# Patient Record
Sex: MALE | Race: WHITE | NOT HISPANIC OR LATINO | Employment: FULL TIME | URBAN - METROPOLITAN AREA
[De-identification: names, ages, dates, MRNs, and addresses within clinical notes are randomized per-mention and may not be internally consistent; named-entity substitution may affect disease eponyms.]

---

## 2023-10-24 RX ORDER — ATORVASTATIN CALCIUM 80 MG/1
80 TABLET, FILM COATED ORAL NIGHTLY
COMMUNITY

## 2023-10-24 RX ORDER — ASPIRIN 81 MG/1
81 TABLET ORAL DAILY
COMMUNITY

## 2023-10-24 RX ORDER — ISOSORBIDE MONONITRATE 30 MG/1
30 TABLET, EXTENDED RELEASE ORAL DAILY
COMMUNITY

## 2023-10-24 RX ORDER — INDOMETHACIN 75 MG/1
75 CAPSULE, EXTENDED RELEASE ORAL 2 TIMES DAILY
COMMUNITY

## 2023-10-24 RX ORDER — LISINOPRIL 10 MG/1
10 TABLET ORAL DAILY
COMMUNITY

## 2023-10-27 ENCOUNTER — OFFICE VISIT (OUTPATIENT)
Dept: CARDIOTHORACIC SURGERY | Facility: CLINIC | Age: 43
End: 2023-10-27
Payer: COMMERCIAL

## 2023-10-27 VITALS
RESPIRATION RATE: 96 BRPM | BODY MASS INDEX: 44.1 KG/M2 | HEIGHT: 71 IN | OXYGEN SATURATION: 20 % | DIASTOLIC BLOOD PRESSURE: 97 MMHG | WEIGHT: 315 LBS | SYSTOLIC BLOOD PRESSURE: 190 MMHG | HEART RATE: 60 BPM

## 2023-10-27 DIAGNOSIS — I10 PRIMARY HYPERTENSION: ICD-10-CM

## 2023-10-27 DIAGNOSIS — E78.5 HYPERLIPIDEMIA, UNSPECIFIED HYPERLIPIDEMIA TYPE: ICD-10-CM

## 2023-10-27 DIAGNOSIS — I25.10 ATHEROSCLEROSIS OF NATIVE CORONARY ARTERY OF NATIVE HEART, UNSPECIFIED WHETHER ANGINA PRESENT: Primary | ICD-10-CM

## 2023-10-27 LAB
ABS NRBC COUNT: 0 THOU/UL (ref 0–0.01)
ABSOLUTE BASOPHIL: 0 10*3/UL (ref 0–0.3)
ABSOLUTE EOSINOPHIL: 0.1 10*3/UL (ref 0–0.6)
ABSOLUTE IMMATURE GRAN: 0.02 THOU/UL (ref 0–0.03)
ABSOLUTE LYMPHOCYTE: 1.2 10*3/UL (ref 1.2–4)
ABSOLUTE MONOCYTE: 0.7 10*3/UL (ref 0.1–0.8)
ALBUMIN SERPL BCP-MCNC: 3.7 G/DL (ref 3.4–5)
ALP SERPL-CCNC: 66 U/L (ref 45–117)
ALT SERPL W P-5'-P-CCNC: 31 U/L (ref 16–61)
ANION GAP SERPL CALC-SCNC: 4 MMOL/L (ref 3–11)
APPEARANCE, UA: CLEAR
APTT PPP: 29.7 SEC (ref 25.8–38.6)
AST SERPL-CCNC: 18 U/L (ref 15–37)
BASOPHILS NFR BLD: 0.3 % (ref 0–3)
BILIRUB SERPL-MCNC: 0.8 MG/DL (ref 0.2–1)
BILIRUB UR QL STRIP: NEGATIVE
BUN SERPL-MCNC: 15 MG/DL (ref 7–18)
BUN/CREAT SERPL: 16.12 RATIO
CALCIUM SERPL-MCNC: 9.2 MG/DL (ref 8.5–10.1)
CHLORIDE SERPL-SCNC: 102 MMOL/L (ref 98–107)
CO2 SERPL-SCNC: 29 MMOL/L (ref 21–32)
COLOR UR: YELLOW
CREAT SERPL-MCNC: 0.93 MG/DL (ref 0.7–1.3)
EOSINOPHIL NFR BLD: 1.1 % (ref 0–6)
ERYTHROCYTE [DISTWIDTH] IN BLOOD BY AUTOMATED COUNT: 12.4 % (ref 0–15.5)
GFR ESTIMATION: > 60
GLUCOSE (UA): NEGATIVE MG/DL
GLUCOSE SERPL-MCNC: 100 MG/DL (ref 74–106)
HCT VFR BLD AUTO: 49.4 % (ref 42–52)
HGB BLD-MCNC: 16.4 G/DL (ref 14–18)
HGB UR QL STRIP: NEGATIVE
IMMATURE GRANULOCYTES: 0.3 % (ref 0–0.43)
INR PPP: 1.2 INR (ref 0.9–1.1)
KETONES UR QL STRIP: NEGATIVE MG/DL
LEUKOCYTE ESTERASE UR QL STRIP: NEGATIVE LEU/UL
LYMPHOCYTES NFR BLD: 19.3 % (ref 20–45)
MCH RBC QN AUTO: 28.1 PG (ref 27–32)
MCHC RBC AUTO-ENTMCNC: 33.2 % (ref 32–36)
MCV RBC AUTO: 84.7 FL (ref 80–97)
MONOCYTES NFR BLD: 11.3 % (ref 2–10)
NEUTROPHILS # BLD AUTO: 4.2 10*3/UL (ref 1.4–7)
NEUTROPHILS NFR BLD: 67.7 % (ref 50–80)
NITRITE UR QL STRIP: NEGATIVE
NUCLEATED RED BLOOD CELLS: 0 % (ref 0–0.2)
PH UR STRIP: 5.5 PH (ref 5–8)
PLATELETS: 232 10*3/UL (ref 130–400)
PMV BLD AUTO: 8.9 FL (ref 9.2–12.2)
POTASSIUM SERPL-SCNC: 4.8 MMOL/L (ref 3.5–5.1)
PROT SERPL-MCNC: 7 G/DL (ref 6.4–8.2)
PROT UR QL STRIP: NEGATIVE MG/DL
PROTHROMBIN TIME: 13.4 SEC (ref 10.2–12.9)
RBC # BLD AUTO: 5.83 10*6/UL (ref 4.7–6.1)
SODIUM BLD-SCNC: 135 MMOL/L (ref 131–143)
SP GR UR STRIP: 1.03 (ref 1–1.03)
TSH SERPL DL<=0.005 MIU/L-ACNC: 1.32 UIU/ML (ref 0.36–3.74)
UROBILINOGEN UR STRIP-ACNC: NORMAL MG/DL
WBC # BLD: 6.2 10*3/UL (ref 4.5–10)

## 2023-10-27 PROCEDURE — 3008F PR BODY MASS INDEX (BMI) DOCUMENTED: ICD-10-PCS | Mod: CPTII,S$GLB,, | Performed by: THORACIC SURGERY (CARDIOTHORACIC VASCULAR SURGERY)

## 2023-10-27 PROCEDURE — 3080F PR MOST RECENT DIASTOLIC BLOOD PRESSURE >= 90 MM HG: ICD-10-PCS | Mod: CPTII,S$GLB,, | Performed by: THORACIC SURGERY (CARDIOTHORACIC VASCULAR SURGERY)

## 2023-10-27 PROCEDURE — 3077F SYST BP >= 140 MM HG: CPT | Mod: CPTII,S$GLB,, | Performed by: THORACIC SURGERY (CARDIOTHORACIC VASCULAR SURGERY)

## 2023-10-27 PROCEDURE — 3080F DIAST BP >= 90 MM HG: CPT | Mod: CPTII,S$GLB,, | Performed by: THORACIC SURGERY (CARDIOTHORACIC VASCULAR SURGERY)

## 2023-10-27 PROCEDURE — 99204 PR OFFICE/OUTPT VISIT, NEW, LEVL IV, 45-59 MIN: ICD-10-PCS | Mod: S$GLB,,, | Performed by: THORACIC SURGERY (CARDIOTHORACIC VASCULAR SURGERY)

## 2023-10-27 PROCEDURE — 99204 OFFICE O/P NEW MOD 45 MIN: CPT | Mod: S$GLB,,, | Performed by: THORACIC SURGERY (CARDIOTHORACIC VASCULAR SURGERY)

## 2023-10-27 PROCEDURE — 4010F ACE/ARB THERAPY RXD/TAKEN: CPT | Mod: CPTII,S$GLB,, | Performed by: THORACIC SURGERY (CARDIOTHORACIC VASCULAR SURGERY)

## 2023-10-27 PROCEDURE — 3077F PR MOST RECENT SYSTOLIC BLOOD PRESSURE >= 140 MM HG: ICD-10-PCS | Mod: CPTII,S$GLB,, | Performed by: THORACIC SURGERY (CARDIOTHORACIC VASCULAR SURGERY)

## 2023-10-27 PROCEDURE — 4010F PR ACE/ARB THEARPY RXD/TAKEN: ICD-10-PCS | Mod: CPTII,S$GLB,, | Performed by: THORACIC SURGERY (CARDIOTHORACIC VASCULAR SURGERY)

## 2023-10-27 PROCEDURE — 3008F BODY MASS INDEX DOCD: CPT | Mod: CPTII,S$GLB,, | Performed by: THORACIC SURGERY (CARDIOTHORACIC VASCULAR SURGERY)

## 2023-10-27 NOTE — PROGRESS NOTES
Subjective:      Patient ID: Pérez Mitchell is a 43 y.o. male who presents for evaluation of aortic aneurysm.      Chief Complaint: CAD eval for surgery    HPI 43-year-old male with past medical history significant for hypertension, hyperlipidemia, osteoarthritis right hip (upcoming surgery) presents for surgical evaluation of coronary artery disease.  Patient was seeking preoperative clearance for hip surgery.  He had abnormal see CCTA performed.  This prompted heart catheterization.  Patient denies any chest pain, shortness of breath, abdominal pain, nausea, vomiting, fever, or chills.  He denies any diaphoresis or cold sweats.  He was referred by his cardiologist Dr. Wood.  Patient works as a .    CCTA 10/9/23 Possible obstructive disease     LHC 10/24/23  Post OP Findings:  Left Main: large caliber vessel with minimal luminal irregularities  LAD: proximal .  The vessel is medium to large in caliber and traverses to the apex.  There is a medium caliber D1.  The vessel fills retrograde from collaterals from the right and pda.  LCx: medium caliber non dominant vessel with medium caliber om1, the vessel has diffuse nonobstructive disease and feeds colleraterals to a medium to large caliber ramus intermedius  Ramus Intermedius:  Medium  to large caliber vessel with ostial , fed distally from left to left collaterals  RCA: large caliber dominant vessel with nonobstructive plaque in mid portion, feeds brisk collaterals to LAD and diagonal distally    Plan: Return patient to nursing unit and monitor for access site complications.  Aggressive medical management.  Start high dose statin therapy, antiplatelet therapy.  Consult surgery for consideration for surgical revascularization.    ECG 10/24/23 SB rate 57     CUS IMPRESSION:10/24/23  1. 20% or less narrowing on the right.  2. 20% or less narrowing on the left.    SAPHENOUS VEIN MAPPING: 10/24/23    Veins in both lower extremities were observed  and measurements obtained. Values as follows:                                    Right            left  Posterior thigh       0.94 cm       0.79 cm    Mid thigh                 0.54 cm      0.70 cm    Distal thigh             0.57 cm       0.55 cm    At knee                   0.49 cm        0.55 cm    Proximal calf.         0.37 cm        0.48 cm    Mid calf                   0.37 cm        0.41 cm    Distal calf               0.43 cm         0.35 cm    PFT 10/24/2023 FEV 1 3.42 or 79% predicted FVC 4.14 76% predicted-mild restrictive disease.    TTE 10/02/2023 ejection fraction 61% no AI or AS, trace mitral regurgitation, aortic root is dilated measuring 3.9 cm.  Review of Systems   Constitutional: Negative for chills, diaphoresis, fever and malaise/fatigue.   HENT:  Negative for congestion and ear discharge.    Eyes:  Negative for pain, photophobia and redness.   Cardiovascular:  Negative for chest pain, claudication and dyspnea on exertion.   Respiratory:  Negative for cough and hemoptysis.    Skin:  Negative for dry skin and flushing.   Musculoskeletal:  Positive for joint pain.   Gastrointestinal:  Negative for anorexia.   Genitourinary:  Negative for dysuria and flank pain.   Neurological:  Negative for aphonia, brief paralysis, focal weakness and weakness.      Past Medical History:   Diagnosis Date    CAD (coronary artery disease)     HLD (hyperlipidemia)     HTN (hypertension)       Past Surgical History:   Procedure Laterality Date    CARDIAC CATHETERIZATION Left 10/24/2023      Family History   Adopted: Yes      Social History     Socioeconomic History    Marital status:    Tobacco Use    Smoking status: Every Day     Types: Vaping w/o nicotine    Smokeless tobacco: Never   Substance and Sexual Activity    Alcohol use: Yes        Medication List with Changes/Refills   Current Medications    ASPIRIN (ECOTRIN) 81 MG EC TABLET    Take 81 mg by mouth once daily.    ATORVASTATIN (LIPITOR) 80 MG TABLET    " Take 80 mg by mouth every evening.    INDOMETHACIN (INDOCIN SR) 75 MG CPSR CR CAPSULE    Take 75 mg by mouth 2 (two) times daily.    ISOSORBIDE MONONITRATE (IMDUR) 30 MG 24 HR TABLET    Take 30 mg by mouth once daily.    LISINOPRIL 10 MG TABLET    Take 10 mg by mouth once daily.        Objective:     There were no vitals taken for this visit.    Physical Exam  Constitutional:       Appearance: Normal appearance.   HENT:      Head: Normocephalic.      Nose: Nose normal.      Mouth/Throat:      Mouth: Mucous membranes are dry.   Eyes:      Pupils: Pupils are equal, round, and reactive to light.   Cardiovascular:      Rate and Rhythm: Normal rate and regular rhythm.   Pulmonary:      Effort: Pulmonary effort is normal.      Breath sounds: Normal breath sounds.   Abdominal:      General: Abdomen is flat.      Palpations: Abdomen is soft.   Musculoskeletal:         General: Normal range of motion.   Skin:     General: Skin is warm and dry.      Capillary Refill: Capillary refill takes less than 2 seconds.   Neurological:      General: No focal deficit present.      Mental Status: He is alert and oriented to person, place, and time.          Labs:  No results found for: "NA", "K", "CL", "CO2", "GLU", "BUN", "CREATININE", "CALCIUM", "PROT", "ALBUMIN", "BILITOT", "ALKPHOS", "AST", "ALT", "ANIONGAP", "ESTGFRAFRICA", "EGFRNONAA"   No results found for: "ALT", "AST", "GGT", "ALKPHOS", "BILITOT"   No results found for: "PT"  No results found for: "WBC", "HGB", "HCT", "MCV", "LABPLAT"    No results found for: "BLOODTYPE"    Imaging:  AAA U/S     No image results found.     No image results found.           Assessment & Plan:     Coronary artery disease   Hypertension   Hyperlipidemia   Obesity  Osteoarthritis yet   Probable sleep apnea   Tobacco use vapes     10/27/2023 discussed coronary artery bypass grafting times 2-3 vessels.  STs risk 0.8% discussed with patient patient is agreeable to proceed.  Hold Ace and NSAIDs prior " to surgery.  Continue baby aspirin.  Scheduled for Monday.  The risks and benefits of surgery were explained to the patient.  The risks include but not limited to bleeding, infection, renal failure, perforated, stroke and death.  He understood all the risks and decided to proceed with surgery      Dr. Greta Briceno  Cardiothoracic Surgery

## 2023-10-30 ENCOUNTER — OUTSIDE PLACE OF SERVICE (OUTPATIENT)
Dept: CARDIOTHORACIC SURGERY | Facility: CLINIC | Age: 43
End: 2023-10-30

## 2023-10-30 PROCEDURE — 33533 CABG ARTERIAL SINGLE: CPT | Mod: ,,, | Performed by: THORACIC SURGERY (CARDIOTHORACIC VASCULAR SURGERY)

## 2023-10-30 PROCEDURE — 33533 PR CABG, ARTERIAL, SINGLE: ICD-10-PCS | Mod: ,,, | Performed by: THORACIC SURGERY (CARDIOTHORACIC VASCULAR SURGERY)

## 2023-10-30 PROCEDURE — 33517 PR CABG, ARTERY-VEIN, SINGLE: ICD-10-PCS | Mod: ,,, | Performed by: THORACIC SURGERY (CARDIOTHORACIC VASCULAR SURGERY)

## 2023-10-30 PROCEDURE — 33517 CABG ARTERY-VEIN SINGLE: CPT | Mod: ,,, | Performed by: THORACIC SURGERY (CARDIOTHORACIC VASCULAR SURGERY)

## 2023-10-30 PROCEDURE — 33508 ENDOSCOPIC VEIN HARVEST: CPT | Mod: 59,,, | Performed by: THORACIC SURGERY (CARDIOTHORACIC VASCULAR SURGERY)

## 2023-10-30 PROCEDURE — 33508 PR ENDOSCOPY W/VIDEO-ASST VEIN HARVEST,CABG: ICD-10-PCS | Mod: 59,,, | Performed by: THORACIC SURGERY (CARDIOTHORACIC VASCULAR SURGERY)

## 2023-10-31 ENCOUNTER — OUTSIDE PLACE OF SERVICE (OUTPATIENT)
Dept: CARDIOTHORACIC SURGERY | Facility: CLINIC | Age: 43
End: 2023-10-31
Payer: COMMERCIAL

## 2023-10-31 PROCEDURE — 99024 POSTOP FOLLOW-UP VISIT: CPT | Mod: ,,, | Performed by: THORACIC SURGERY (CARDIOTHORACIC VASCULAR SURGERY)

## 2023-10-31 PROCEDURE — 99024 PR POST-OP FOLLOW-UP VISIT: ICD-10-PCS | Mod: ,,, | Performed by: THORACIC SURGERY (CARDIOTHORACIC VASCULAR SURGERY)

## 2023-11-01 ENCOUNTER — OUTSIDE PLACE OF SERVICE (OUTPATIENT)
Dept: CARDIOTHORACIC SURGERY | Facility: CLINIC | Age: 43
End: 2023-11-01
Payer: COMMERCIAL

## 2023-11-01 PROCEDURE — 99024 POSTOP FOLLOW-UP VISIT: CPT | Mod: ,,, | Performed by: NURSE PRACTITIONER

## 2023-11-01 PROCEDURE — 99024 PR POST-OP FOLLOW-UP VISIT: ICD-10-PCS | Mod: ,,, | Performed by: NURSE PRACTITIONER

## 2023-11-02 ENCOUNTER — OUTSIDE PLACE OF SERVICE (OUTPATIENT)
Dept: CARDIOTHORACIC SURGERY | Facility: CLINIC | Age: 43
End: 2023-11-02
Payer: COMMERCIAL

## 2023-11-02 PROCEDURE — 99024 POSTOP FOLLOW-UP VISIT: CPT | Mod: ,,, | Performed by: NURSE PRACTITIONER

## 2023-11-02 PROCEDURE — 99024 PR POST-OP FOLLOW-UP VISIT: ICD-10-PCS | Mod: ,,, | Performed by: NURSE PRACTITIONER

## 2023-11-03 ENCOUNTER — OUTSIDE PLACE OF SERVICE (OUTPATIENT)
Dept: CARDIOTHORACIC SURGERY | Facility: CLINIC | Age: 43
End: 2023-11-03
Payer: COMMERCIAL

## 2023-11-03 PROCEDURE — 99024 POSTOP FOLLOW-UP VISIT: CPT | Mod: ,,, | Performed by: NURSE PRACTITIONER

## 2023-11-03 PROCEDURE — 99024 PR POST-OP FOLLOW-UP VISIT: ICD-10-PCS | Mod: ,,, | Performed by: NURSE PRACTITIONER

## 2023-11-08 ENCOUNTER — HOSPITAL ENCOUNTER (OUTPATIENT)
Dept: RADIOLOGY | Facility: CLINIC | Age: 43
Discharge: HOME | End: 2023-11-08
Attending: NURSE PRACTITIONER
Payer: COMMERCIAL

## 2023-11-08 ENCOUNTER — OFFICE VISIT (OUTPATIENT)
Dept: CARDIOTHORACIC SURGERY | Facility: CLINIC | Age: 43
End: 2023-11-08
Payer: COMMERCIAL

## 2023-11-08 VITALS
BODY MASS INDEX: 44.1 KG/M2 | HEART RATE: 67 BPM | OXYGEN SATURATION: 94 % | DIASTOLIC BLOOD PRESSURE: 85 MMHG | HEIGHT: 71 IN | SYSTOLIC BLOOD PRESSURE: 145 MMHG | WEIGHT: 315 LBS | RESPIRATION RATE: 22 BRPM

## 2023-11-08 DIAGNOSIS — Z95.1 HX OF CABG: Primary | ICD-10-CM

## 2023-11-08 DIAGNOSIS — Z95.1 HX OF CABG: ICD-10-CM

## 2023-11-08 PROCEDURE — 99024 POSTOP FOLLOW-UP VISIT: CPT | Mod: S$GLB,,, | Performed by: NURSE PRACTITIONER

## 2023-11-08 PROCEDURE — 99024 PR POST-OP FOLLOW-UP VISIT: ICD-10-PCS | Mod: S$GLB,,, | Performed by: NURSE PRACTITIONER

## 2023-11-08 PROCEDURE — 71046 X-RAY EXAM CHEST 2 VIEWS: CPT | Mod: 26,,, | Performed by: RADIOLOGY

## 2023-11-08 PROCEDURE — 71046 XR CHEST PA AND LATERAL: ICD-10-PCS | Mod: 26,,, | Performed by: RADIOLOGY

## 2023-11-08 RX ORDER — AMIODARONE HYDROCHLORIDE 200 MG/1
TABLET ORAL
COMMUNITY
Start: 2023-11-03

## 2023-11-08 RX ORDER — PANTOPRAZOLE SODIUM 40 MG/1
40 TABLET, DELAYED RELEASE ORAL
COMMUNITY
Start: 2023-11-03

## 2023-11-08 RX ORDER — HYDROCODONE BITARTRATE AND ACETAMINOPHEN 5; 325 MG/1; MG/1
TABLET ORAL
COMMUNITY
Start: 2023-11-03

## 2023-11-08 RX ORDER — METOPROLOL TARTRATE 25 MG/1
25 TABLET, FILM COATED ORAL 2 TIMES DAILY
COMMUNITY
Start: 2023-11-03

## 2023-11-08 RX ORDER — POTASSIUM CHLORIDE 750 MG/1
10 CAPSULE, EXTENDED RELEASE ORAL
COMMUNITY
Start: 2023-11-03

## 2023-11-08 RX ORDER — FUROSEMIDE 40 MG/1
40 TABLET ORAL
COMMUNITY
Start: 2023-11-03

## 2023-11-09 ENCOUNTER — TELEPHONE (OUTPATIENT)
Dept: CARDIOTHORACIC SURGERY | Facility: CLINIC | Age: 43
End: 2023-11-09
Payer: COMMERCIAL

## 2023-11-09 NOTE — PROGRESS NOTES
Subjective:      Patient ID: Pérez Mitchell is a 43 y.o. male who presents for evaluation of aortic aneurysm.      Chief Complaint: CAD eval for surgery    HPI 43-year-old male with past medical history significant for hypertension, hyperlipidemia, osteoarthritis right hip (upcoming surgery) presents for surgical evaluation of coronary artery disease.  Patient was seeking preoperative clearance for hip surgery.  He had abnormal see CCTA performed.  This prompted heart catheterization.  Patient denies any chest pain, shortness of breath, abdominal pain, nausea, vomiting, fever, or chills.  He denies any diaphoresis or cold sweats.  He was referred by his cardiologist Dr. Wood.  Patient works as a .    CCTA 10/9/23 Possible obstructive disease     LHC 10/24/23  Post OP Findings:  Left Main: large caliber vessel with minimal luminal irregularities  LAD: proximal .  The vessel is medium to large in caliber and traverses to the apex.  There is a medium caliber D1.  The vessel fills retrograde from collaterals from the right and pda.  LCx: medium caliber non dominant vessel with medium caliber om1, the vessel has diffuse nonobstructive disease and feeds colleraterals to a medium to large caliber ramus intermedius  Ramus Intermedius:  Medium  to large caliber vessel with ostial , fed distally from left to left collaterals  RCA: large caliber dominant vessel with nonobstructive plaque in mid portion, feeds brisk collaterals to LAD and diagonal distally    Plan: Return patient to nursing unit and monitor for access site complications.  Aggressive medical management.  Start high dose statin therapy, antiplatelet therapy.  Consult surgery for consideration for surgical revascularization.    ECG 10/24/23 SB rate 57     CUS IMPRESSION:10/24/23  1. 20% or less narrowing on the right.  2. 20% or less narrowing on the left.    SAPHENOUS VEIN MAPPING: 10/24/23    Veins in both lower extremities were observed  and measurements obtained. Values as follows:                                    Right            left  Posterior thigh       0.94 cm       0.79 cm    Mid thigh                 0.54 cm      0.70 cm    Distal thigh             0.57 cm       0.55 cm    At knee                   0.49 cm        0.55 cm    Proximal calf.         0.37 cm        0.48 cm    Mid calf                   0.37 cm        0.41 cm    Distal calf               0.43 cm         0.35 cm    PFT 10/24/2023 FEV 1 3.42 or 79% predicted FVC 4.14 76% predicted-mild restrictive disease.    TTE 10/02/2023 ejection fraction 61% no AI or AS, trace mitral regurgitation, aortic root is dilated measuring 3.9 cm.    Clinic 11/08/2023-status post off pump CAB x2 on 10/30/2023 presents for one-week post discharge follow up.  Patient reports to be doing well denies any signs symptoms of infection surgical site.  Denies any chest pain or shortness of breath.  He was moving well except for his hip pain.  Requesting get back on indomethacin.  Reviewed lab work.  Review of Systems   Constitutional: Negative for chills, diaphoresis, fever and malaise/fatigue.   HENT:  Negative for congestion and ear discharge.    Eyes:  Negative for pain, photophobia and redness.   Cardiovascular:  Negative for chest pain, claudication and dyspnea on exertion.   Respiratory:  Positive for snoring. Negative for cough and hemoptysis.    Skin:  Negative for dry skin and flushing.   Musculoskeletal:  Positive for joint pain.   Gastrointestinal:  Negative for anorexia.   Genitourinary:  Negative for dysuria and flank pain.   Neurological:  Negative for aphonia, brief paralysis, focal weakness and weakness.      Past Medical History:   Diagnosis Date    CAD (coronary artery disease)     HLD (hyperlipidemia)     HTN (hypertension)       Past Surgical History:   Procedure Laterality Date    CARDIAC CATHETERIZATION Left 10/24/2023    CORONARY ARTERY BYPASS GRAFT  10/30/2023    x2 vessels      Family  "History   Adopted: Yes      Social History     Socioeconomic History    Marital status:    Tobacco Use    Smoking status: Every Day     Types: Vaping w/o nicotine    Smokeless tobacco: Never   Substance and Sexual Activity    Alcohol use: Yes     Comment: rare        Medication List with Changes/Refills   Current Medications    AMIODARONE (PACERONE) 200 MG TAB    TAKE 2 TABLETS BY MOUTH TWICE A DAY FOR 30 DAYS    ASPIRIN (ECOTRIN) 81 MG EC TABLET    Take 81 mg by mouth once daily.    ATORVASTATIN (LIPITOR) 80 MG TABLET    Take 80 mg by mouth every evening.    FUROSEMIDE (LASIX) 40 MG TABLET    Take 40 mg by mouth.    HYDROCODONE-ACETAMINOPHEN (NORCO) 5-325 MG PER TABLET    TAKE 1 TABLET BY MOUTH EVERY 8 TO 12 HOURS AS NEEDED FOR PAIN    INDOMETHACIN (INDOCIN SR) 75 MG CPSR CR CAPSULE    Take 75 mg by mouth 2 (two) times daily.    ISOSORBIDE MONONITRATE (IMDUR) 30 MG 24 HR TABLET    Take 30 mg by mouth once daily.    LISINOPRIL 10 MG TABLET    Take 10 mg by mouth once daily.    METOPROLOL TARTRATE (LOPRESSOR) 25 MG TABLET    Take 25 mg by mouth 2 (two) times daily.    PANTOPRAZOLE (PROTONIX) 40 MG TABLET    Take 40 mg by mouth.    POTASSIUM CHLORIDE (MICRO-K) 10 MEQ CPSR    Take 10 mEq by mouth.        Objective:     BP (!) 145/85 (BP Location: Left forearm, Patient Position: Sitting, BP Method: Medium (Automatic))   Pulse 67   Resp (!) 22   Ht 5' 11" (1.803 m)   Wt (!) 168.2 kg (370 lb 14.4 oz)   SpO2 (!) 94%   BMI 51.73 kg/m²     Physical Exam  Constitutional:       Appearance: Normal appearance.   HENT:      Head: Normocephalic.      Nose: Nose normal.      Mouth/Throat:      Mouth: Mucous membranes are dry.   Eyes:      Pupils: Pupils are equal, round, and reactive to light.   Cardiovascular:      Rate and Rhythm: Normal rate and regular rhythm.   Pulmonary:      Effort: Pulmonary effort is normal.      Breath sounds: Normal breath sounds.   Abdominal:      General: Abdomen is flat.      Palpations: " "Abdomen is soft.   Musculoskeletal:         General: Normal range of motion.   Skin:     General: Skin is warm and dry.      Capillary Refill: Capillary refill takes less than 2 seconds.      Comments: The sternum well-approximated with no erythema or drainage.  Left leg saphenous harvest site no erythema or drainage.  Chronic venous insufficiency bilateral lower extremities   Neurological:      General: No focal deficit present.      Mental Status: He is alert and oriented to person, place, and time.          Labs:  Sodium   Date Value Ref Range Status   10/27/2023 135 131 - 143 mmol/L Final     Potassium   Date Value Ref Range Status   10/27/2023 4.8 3.5 - 5.1 mmol/L Final     Chloride   Date Value Ref Range Status   10/27/2023 102 98 - 107 mmol/L Final     CO2   Date Value Ref Range Status   10/27/2023 29 21 - 32 mmol/L Final     Glucose   Date Value Ref Range Status   10/27/2023 100 74 - 106 mg/dL Final     BUN   Date Value Ref Range Status   10/27/2023 15.0 7.0 - 18.0 mg/dL Final     Creatinine   Date Value Ref Range Status   10/27/2023 0.93 0.70 - 1.30 mg/dL Final     Calcium   Date Value Ref Range Status   10/27/2023 9.2 8.5 - 10.1 mg/dL Final     Total Protein   Date Value Ref Range Status   10/27/2023 7.0 6.4 - 8.2 g/dL Final     Albumin   Date Value Ref Range Status   10/27/2023 3.7 3.4 - 5.0 g/dL Final     Total Bilirubin   Date Value Ref Range Status   10/27/2023 0.8 0.2 - 1.0 mg/dL Final     Alkaline Phosphatase   Date Value Ref Range Status   10/27/2023 66 45 - 117 U/L Final     AST   Date Value Ref Range Status   10/27/2023 18 15 - 37 U/L Final     ALT   Date Value Ref Range Status   10/27/2023 31 16 - 61 U/L Final     Anion Gap   Date Value Ref Range Status   10/27/2023 4.0 3.0 - 11.0 mmol/L Final      Lab Results   Component Value Date    ALT 31 10/27/2023    AST 18 10/27/2023    ALKPHOS 66 10/27/2023    BILITOT 0.8 10/27/2023      No results found for: "PT"  Lab Results   Component Value Date    " "WBC 6.2 10/27/2023    HGB 16.4 10/27/2023    HCT 49.4 10/27/2023    MCV 84.7 10/27/2023    LABPLAT 232 10/27/2023       No results found for: "BLOODTYPE"      X-Ray Chest PA And Lateral  Narrative: EXAMINATION:  XR CHEST PA AND LATERAL    CLINICAL HISTORY:  Presence of aortocoronary bypass graft    TECHNIQUE:  PA and lateral views of the chest were performed.    COMPARISON:  None    FINDINGS:  Cardiac silhouette is enlarged.  Prior sternotomy noted.  There is posterior costophrenic angle blunting, likely on the left which is concerning for a small pleural effusion.  There is some ill-defined somewhat linear parenchymal opacities in the left lung base which likely represent subsegmental atelectasis.  Infectious/inflammatory infiltrate not entirely excluded, correlate clinically.  No acute osseous findings demonstrated.  Impression: As above    Electronically signed by: Cricket Davneport MD  Date:    11/08/2023  Time:    13:27     No image results found.           Assessment & Plan:     Coronary artery disease status post CAB x2  Hypertension   Hyperlipidemia   Obesity  Osteoarthritis yet   Probable sleep apnea   Tobacco use vapes     10/27/2023 discussed coronary artery bypass grafting times 2-3 vessels.  STs risk 0.8% discussed with patient patient is agreeable to proceed.  Hold Ace and NSAIDs prior to surgery.  Continue baby aspirin.  Scheduled for Monday.    11/08/2023 doing well!  Patient continue progressive mobility and IS.  Continue sternal precautions..  Stop Ten mEq potassium daily.  Recheck BMP in one-week.  Requesting to restart indomethacin we will discuss with Dr. Briceno get back with the patient.  Return to clinic 3 weeks and p.r.n.    Clarence SWENSONP-C  Cardiothoracic Surgery    "

## 2023-11-10 DIAGNOSIS — Z95.1 HX OF CABG: Primary | ICD-10-CM

## 2023-11-13 ENCOUNTER — TELEPHONE (OUTPATIENT)
Dept: CARDIOTHORACIC SURGERY | Facility: CLINIC | Age: 43
End: 2023-11-13
Payer: COMMERCIAL

## 2023-11-13 NOTE — TELEPHONE ENCOUNTER
11-13-23 @ 14:18    [facility] [place] [place]  Ph: [phone]  Fax: [phone]

## 2023-11-13 NOTE — TELEPHONE ENCOUNTER
----- Message from Ever Thomason sent at 11/13/2023  1:10 PM CST -----  Contact: [facility]  Liz is calling in regards to getting orders to re-check pts potassium before appt on 11/20. Please call back at [phone] and fax to [phone]                Thanks  LEONARD

## 2023-11-14 ENCOUNTER — TELEPHONE (OUTPATIENT)
Dept: CARDIOTHORACIC SURGERY | Facility: CLINIC | Age: 43
End: 2023-11-14
Payer: COMMERCIAL

## 2023-11-14 ENCOUNTER — PATIENT MESSAGE (OUTPATIENT)
Dept: CARDIOTHORACIC SURGERY | Facility: CLINIC | Age: 43
End: 2023-11-14
Payer: COMMERCIAL

## 2023-11-14 DIAGNOSIS — Z95.1 HX OF CABG: Primary | ICD-10-CM

## 2023-11-14 DIAGNOSIS — Z51.89 ENCOUNTER FOR WOUND CARE: ICD-10-CM

## 2023-11-14 RX ORDER — CEPHALEXIN 500 MG/1
500 CAPSULE ORAL EVERY 8 HOURS
Qty: 21 CAPSULE | Refills: 0 | Status: SHIPPED | OUTPATIENT
Start: 2023-11-14 | End: 2023-11-21

## 2023-11-14 NOTE — PROGRESS NOTES
Patient called concerned about former chest tube site. Reports scab came off and has purulent drainage. Picture reviewed with moderate slough. We will prescribe keflex 500mg TID X 7 days. Patient to f/u if wound worsens or keep scheduled appointment on Monday. Will get home health to perform aqua trang ag dressing changes daily.

## 2023-11-15 NOTE — TELEPHONE ENCOUNTER
11-14-23 @ 16:52    Wound care instructions explained to pt.  Verbalized understanding.       Orders sent and V.O. given to [facility] ([facility]) for wound care -  harleen  dressing change.

## 2023-11-15 NOTE — TELEPHONE ENCOUNTER
11-15-23 @ 16:33    FAXED (with conf) Rx (keflex) to [facility] #[id number] ([place])

## 2023-11-15 NOTE — TELEPHONE ENCOUNTER
11-14-23 @ 16:39    FAXED (with conf) completed/signed short term disability claims forms to [employer] (F: [phone]) [16 pgs]

## 2023-11-16 NOTE — TELEPHONE ENCOUNTER
11-16-23 @ 16:07    Short term disability paperwork emailed to pt, per request.    [16 pgs]      [email or web address]

## 2023-11-20 ENCOUNTER — OFFICE VISIT (OUTPATIENT)
Dept: CARDIOTHORACIC SURGERY | Facility: CLINIC | Age: 43
End: 2023-11-20
Payer: COMMERCIAL

## 2023-11-20 VITALS
BODY MASS INDEX: 44.1 KG/M2 | RESPIRATION RATE: 20 BRPM | HEART RATE: 54 BPM | OXYGEN SATURATION: 97 % | DIASTOLIC BLOOD PRESSURE: 84 MMHG | SYSTOLIC BLOOD PRESSURE: 155 MMHG | HEIGHT: 71 IN | WEIGHT: 315 LBS

## 2023-11-20 DIAGNOSIS — I25.810 CORONARY ARTERY DISEASE INVOLVING CORONARY BYPASS GRAFT OF NATIVE HEART, UNSPECIFIED WHETHER ANGINA PRESENT: Primary | ICD-10-CM

## 2023-11-20 PROCEDURE — 3008F PR BODY MASS INDEX (BMI) DOCUMENTED: ICD-10-PCS | Mod: CPTII,S$GLB,, | Performed by: NURSE PRACTITIONER

## 2023-11-20 PROCEDURE — 3008F BODY MASS INDEX DOCD: CPT | Mod: CPTII,S$GLB,, | Performed by: NURSE PRACTITIONER

## 2023-11-20 PROCEDURE — 3079F PR MOST RECENT DIASTOLIC BLOOD PRESSURE 80-89 MM HG: ICD-10-PCS | Mod: CPTII,S$GLB,, | Performed by: NURSE PRACTITIONER

## 2023-11-20 PROCEDURE — 3077F SYST BP >= 140 MM HG: CPT | Mod: CPTII,S$GLB,, | Performed by: NURSE PRACTITIONER

## 2023-11-20 PROCEDURE — 99024 POSTOP FOLLOW-UP VISIT: CPT | Mod: S$GLB,,, | Performed by: NURSE PRACTITIONER

## 2023-11-20 PROCEDURE — 4010F PR ACE/ARB THEARPY RXD/TAKEN: ICD-10-PCS | Mod: CPTII,S$GLB,, | Performed by: NURSE PRACTITIONER

## 2023-11-20 PROCEDURE — 3077F PR MOST RECENT SYSTOLIC BLOOD PRESSURE >= 140 MM HG: ICD-10-PCS | Mod: CPTII,S$GLB,, | Performed by: NURSE PRACTITIONER

## 2023-11-20 PROCEDURE — 4010F ACE/ARB THERAPY RXD/TAKEN: CPT | Mod: CPTII,S$GLB,, | Performed by: NURSE PRACTITIONER

## 2023-11-20 PROCEDURE — 99024 PR POST-OP FOLLOW-UP VISIT: ICD-10-PCS | Mod: S$GLB,,, | Performed by: NURSE PRACTITIONER

## 2023-11-20 PROCEDURE — 1159F PR MEDICATION LIST DOCUMENTED IN MEDICAL RECORD: ICD-10-PCS | Mod: CPTII,S$GLB,, | Performed by: NURSE PRACTITIONER

## 2023-11-20 PROCEDURE — 3079F DIAST BP 80-89 MM HG: CPT | Mod: CPTII,S$GLB,, | Performed by: NURSE PRACTITIONER

## 2023-11-20 PROCEDURE — 1159F MED LIST DOCD IN RCRD: CPT | Mod: CPTII,S$GLB,, | Performed by: NURSE PRACTITIONER

## 2023-11-20 NOTE — PROGRESS NOTES
Subjective:      [place] - Cardiothoracic Vascular Surgery  Follow-up Visit                  Patient ID: Pérez Mitchell is a 43 y.o. male who presents for evaluation of aortic aneurysm.      Chief Complaint: CAD eval for surgery    HPI 43-year-old male with past medical history significant for hypertension, hyperlipidemia, osteoarthritis right hip (upcoming surgery) presents for surgical evaluation of coronary artery disease.  Patient was seeking preoperative clearance for hip surgery.  He had abnormal see CCTA performed.  This prompted heart catheterization.  Patient denies any chest pain, shortness of breath, abdominal pain, nausea, vomiting, fever, or chills.  He denies any diaphoresis or cold sweats.  He was referred by his cardiologist Dr. Wood.  Patient works as a .    CCTA 10/9/23 Possible obstructive disease     C 10/24/23  Post OP Findings:  Left Main: large caliber vessel with minimal luminal irregularities  LAD: proximal .  The vessel is medium to large in caliber and traverses to the apex.  There is a medium caliber D1.  The vessel fills retrograde from collaterals from the right and pda.  LCx: medium caliber non dominant vessel with medium caliber om1, the vessel has diffuse nonobstructive disease and feeds colleraterals to a medium to large caliber ramus intermedius  Ramus Intermedius:  Medium  to large caliber vessel with ostial , fed distally from left to left collaterals  RCA: large caliber dominant vessel with nonobstructive plaque in mid portion, feeds brisk collaterals to LAD and diagonal distally    Plan: Return patient to nursing unit and monitor for access site complications.  Aggressive medical management.  Start high dose statin therapy, antiplatelet therapy.  Consult surgery for consideration for surgical revascularization.    ECG 10/24/23 SB rate 57     CUS IMPRESSION:10/24/23  1. 20% or less narrowing on the right.  2. 20% or less narrowing on the  left.    SAPHENOUS VEIN MAPPING: 10/24/23    Veins in both lower extremities were observed and measurements obtained. Values as follows:                                    Right            left  Posterior thigh       0.94 cm       0.79 cm    Mid thigh                 0.54 cm      0.70 cm    Distal thigh             0.57 cm       0.55 cm    At knee                   0.49 cm        0.55 cm    Proximal calf.         0.37 cm        0.48 cm    Mid calf                   0.37 cm        0.41 cm    Distal calf               0.43 cm         0.35 cm    PFT 10/24/2023 FEV 1 3.42 or 79% predicted FVC 4.14 76% predicted-mild restrictive disease.    TTE 10/02/2023 ejection fraction 61% no AI or AS, trace mitral regurgitation, aortic root is dilated measuring 3.9 cm.    Clinic 11/08/2023-status post off pump CAB x2 on 10/30/2023 presents for one-week post discharge follow up.  Patient reports to be doing well denies any signs symptoms of infection surgical site.  Denies any chest pain or shortness of breath.  He was moving well except for his hip pain.  Requesting get back on indomethacin.  Reviewed lab work.    Clinic 11/20/2023: Feels good at home. No SOB, c/p, legs edema. Has been cutting back salt and processed foods intake. No incisional pain. Home Health has been seeing patient w/o issues. No chills/fever or swelling at CT site. NO drainage there.     Review of Systems   Constitutional: Negative for chills, diaphoresis, fever and malaise/fatigue.   HENT: Negative.  Negative for congestion and ear discharge.    Eyes:  Negative for pain, photophobia and redness.   Cardiovascular:  Negative for chest pain, claudication and dyspnea on exertion.   Respiratory:  Positive for snoring. Negative for cough and hemoptysis.    Skin:  Negative for dry skin and flushing.   Musculoskeletal:  Positive for joint pain.   Gastrointestinal:  Negative for anorexia.   Genitourinary:  Negative for dysuria and flank pain.   Neurological:  Negative  "for aphonia, brief paralysis, focal weakness and weakness.      Past Medical History:   Diagnosis Date    CAD (coronary artery disease)     HLD (hyperlipidemia)     HTN (hypertension)       Past Surgical History:   Procedure Laterality Date    CARDIAC CATHETERIZATION Left 10/24/2023    CORONARY ARTERY BYPASS GRAFT  10/30/2023    x2 vessels      Family History   Adopted: Yes      Social History     Socioeconomic History    Marital status:    Tobacco Use    Smoking status: Every Day     Types: Vaping w/o nicotine    Smokeless tobacco: Never   Substance and Sexual Activity    Alcohol use: Yes     Comment: rare        Medication List with Changes/Refills   Current Medications    AMIODARONE (PACERONE) 200 MG TAB    TAKE 2 TABLETS BY MOUTH TWICE A DAY FOR 30 DAYS    ASPIRIN (ECOTRIN) 81 MG EC TABLET    Take 81 mg by mouth once daily.    ATORVASTATIN (LIPITOR) 80 MG TABLET    Take 80 mg by mouth every evening.    CEPHALEXIN (KEFLEX) 500 MG CAPSULE    Take 1 capsule (500 mg total) by mouth every 8 (eight) hours. for 7 days    FUROSEMIDE (LASIX) 40 MG TABLET    Take 40 mg by mouth.    HYDROCODONE-ACETAMINOPHEN (NORCO) 5-325 MG PER TABLET    TAKE 1 TABLET BY MOUTH EVERY 8 TO 12 HOURS AS NEEDED FOR PAIN    INDOMETHACIN (INDOCIN SR) 75 MG CPSR CR CAPSULE    Take 75 mg by mouth 2 (two) times daily.    ISOSORBIDE MONONITRATE (IMDUR) 30 MG 24 HR TABLET    Take 30 mg by mouth once daily.    LISINOPRIL 10 MG TABLET    Take 10 mg by mouth once daily.    METOPROLOL TARTRATE (LOPRESSOR) 25 MG TABLET    Take 25 mg by mouth 2 (two) times daily.    PANTOPRAZOLE (PROTONIX) 40 MG TABLET    Take 40 mg by mouth.    POTASSIUM CHLORIDE (MICRO-K) 10 MEQ CPSR    Take 10 mEq by mouth.        Objective:     BP (!) 155/84 (BP Location: Left arm, Patient Position: Sitting, BP Method: Medium (Automatic))   Pulse (!) 54   Resp 20   Ht 5' 11" (1.803 m)   Wt (!) 166.1 kg (366 lb 3.2 oz)   SpO2 97%   BMI 51.07 kg/m²     Physical " Exam  Constitutional:       Appearance: Normal appearance.   HENT:      Head: Normocephalic.      Nose: Nose normal.   Eyes:      Pupils: Pupils are equal, round, and reactive to light.   Cardiovascular:      Rate and Rhythm: Normal rate and regular rhythm.   Pulmonary:      Effort: Pulmonary effort is normal.      Breath sounds: Normal breath sounds.   Abdominal:      General: Abdomen is flat.      Palpations: Abdomen is soft.   Musculoskeletal:         General: Normal range of motion.   Skin:     General: Skin is warm and dry.      Capillary Refill: Capillary refill takes less than 2 seconds.      Comments: The sternum well-approximated with no erythema or drainage.  Left leg saphenous harvest site no erythema or drainage.  Chronic venous insufficiency bilateral lower extremities   Neurological:      General: No focal deficit present.      Mental Status: He is alert and oriented to person, place, and time.          Labs:  Sodium   Date Value Ref Range Status   10/27/2023 135 131 - 143 mmol/L Final     Potassium   Date Value Ref Range Status   10/27/2023 4.8 3.5 - 5.1 mmol/L Final     Chloride   Date Value Ref Range Status   10/27/2023 102 98 - 107 mmol/L Final     CO2   Date Value Ref Range Status   10/27/2023 29 21 - 32 mmol/L Final     Glucose   Date Value Ref Range Status   10/27/2023 100 74 - 106 mg/dL Final     BUN   Date Value Ref Range Status   10/27/2023 15.0 7.0 - 18.0 mg/dL Final     Creatinine   Date Value Ref Range Status   10/27/2023 0.93 0.70 - 1.30 mg/dL Final     Calcium   Date Value Ref Range Status   10/27/2023 9.2 8.5 - 10.1 mg/dL Final     Total Protein   Date Value Ref Range Status   10/27/2023 7.0 6.4 - 8.2 g/dL Final     Albumin   Date Value Ref Range Status   10/27/2023 3.7 3.4 - 5.0 g/dL Final     Total Bilirubin   Date Value Ref Range Status   10/27/2023 0.8 0.2 - 1.0 mg/dL Final     Alkaline Phosphatase   Date Value Ref Range Status   10/27/2023 66 45 - 117 U/L Final     AST   Date Value  "Ref Range Status   10/27/2023 18 15 - 37 U/L Final     ALT   Date Value Ref Range Status   10/27/2023 31 16 - 61 U/L Final     Anion Gap   Date Value Ref Range Status   10/27/2023 4.0 3.0 - 11.0 mmol/L Final      Lab Results   Component Value Date    ALT 31 10/27/2023    AST 18 10/27/2023    ALKPHOS 66 10/27/2023    BILITOT 0.8 10/27/2023      No results found for: "PT"  Lab Results   Component Value Date    WBC 6.2 10/27/2023    HGB 16.4 10/27/2023    HCT 49.4 10/27/2023    MCV 84.7 10/27/2023    LABPLAT 232 10/27/2023       No results found for: "BLOODTYPE"      X-Ray Chest PA And Lateral  Narrative: EXAMINATION:  XR CHEST PA AND LATERAL    CLINICAL HISTORY:  Presence of aortocoronary bypass graft    TECHNIQUE:  PA and lateral views of the chest were performed.    COMPARISON:  None    FINDINGS:  Cardiac silhouette is enlarged.  Prior sternotomy noted.  There is posterior costophrenic angle blunting, likely on the left which is concerning for a small pleural effusion.  There is some ill-defined somewhat linear parenchymal opacities in the left lung base which likely represent subsegmental atelectasis.  Infectious/inflammatory infiltrate not entirely excluded, correlate clinically.  No acute osseous findings demonstrated.  Impression: As above    Electronically signed by: Cricket Davenport MD  Date:    11/08/2023  Time:    13:27     No image results found.           Assessment & Plan:     Coronary artery disease status post CAB x2  Hypertension   Hyperlipidemia   Obesity  Osteoarthritis yet   Probable sleep apnea   Tobacco use vapes     10/27/2023 discussed coronary artery bypass grafting times 2-3 vessels.  STs risk 0.8% discussed with patient patient is agreeable to proceed.  Hold Ace and NSAIDs prior to surgery.  Continue baby aspirin.  Scheduled for Monday.    11/08/2023 doing well!  Patient continue progressive mobility and IS.  Continue sternal precautions..  Stop Ten mEq potassium daily.  Recheck BMP in " one-week.  Requesting to restart indomethacin we will discuss with Dr. Briceno get back with the patient.  Return to clinic 3 weeks and p.r.n.    11/20/2023: No new issues since last visit on 11/8/2023. CT site had scalp that fell off. Drainage is lessened. No redness at site. Site is steri-stripped. BMP checked. Stable renal function, K borderline high at 5.1. Advised low K diet- list of low K foods given to pt/wife.  Repeat labs in 2 weeks prior to next office visit. If K continues to be elevated, may need to cut down or d/c Lisinopril. BP is stable in the 120's at home. Sternum is stable on exam but still too early to drive in highway. Pt is inquiring about hip surgery. Plan to revisit at 30 days office visit.     Yancy Alcantara, MESSIP-C  Cardiothoracic Vascular Surgery

## 2023-12-01 ENCOUNTER — HOSPITAL ENCOUNTER (OUTPATIENT)
Dept: RADIOLOGY | Facility: CLINIC | Age: 43
Discharge: HOME | End: 2023-12-01
Attending: NURSE PRACTITIONER
Payer: COMMERCIAL

## 2023-12-01 ENCOUNTER — OFFICE VISIT (OUTPATIENT)
Dept: CARDIOTHORACIC SURGERY | Facility: CLINIC | Age: 43
End: 2023-12-01
Payer: COMMERCIAL

## 2023-12-01 VITALS
RESPIRATION RATE: 20 BRPM | HEART RATE: 55 BPM | DIASTOLIC BLOOD PRESSURE: 78 MMHG | WEIGHT: 315 LBS | OXYGEN SATURATION: 97 % | HEIGHT: 71 IN | BODY MASS INDEX: 44.1 KG/M2 | SYSTOLIC BLOOD PRESSURE: 128 MMHG

## 2023-12-01 DIAGNOSIS — I10 PRIMARY HYPERTENSION: ICD-10-CM

## 2023-12-01 DIAGNOSIS — I25.810 CORONARY ARTERY DISEASE INVOLVING CORONARY BYPASS GRAFT OF NATIVE HEART, UNSPECIFIED WHETHER ANGINA PRESENT: ICD-10-CM

## 2023-12-01 DIAGNOSIS — Z95.1 HX OF CABG: Primary | ICD-10-CM

## 2023-12-01 DIAGNOSIS — I25.810 CORONARY ARTERY DISEASE INVOLVING CORONARY BYPASS GRAFT OF NATIVE HEART, UNSPECIFIED WHETHER ANGINA PRESENT: Primary | ICD-10-CM

## 2023-12-01 DIAGNOSIS — E78.5 HYPERLIPIDEMIA, UNSPECIFIED HYPERLIPIDEMIA TYPE: ICD-10-CM

## 2023-12-01 DIAGNOSIS — Z95.1 HX OF CABG: ICD-10-CM

## 2023-12-01 PROCEDURE — 99024 PR POST-OP FOLLOW-UP VISIT: ICD-10-PCS | Mod: S$GLB,,, | Performed by: NURSE PRACTITIONER

## 2023-12-01 PROCEDURE — 99024 POSTOP FOLLOW-UP VISIT: CPT | Mod: S$GLB,,, | Performed by: NURSE PRACTITIONER

## 2023-12-01 NOTE — PROGRESS NOTES
Subjective:      [place] - Cardiothoracic Vascular Surgery  Follow-up Visit                  Patient ID: Pérez Mitchell is a 43 y.o. male who presents for evaluation of aortic aneurysm.      Chief Complaint: CAD eval for surgery    HPI 43-year-old male with past medical history significant for hypertension, hyperlipidemia, osteoarthritis right hip (upcoming surgery) presents for surgical evaluation of coronary artery disease.  Patient was seeking preoperative clearance for hip surgery.  He had abnormal see CCTA performed.  This prompted heart catheterization.  Patient denies any chest pain, shortness of breath, abdominal pain, nausea, vomiting, fever, or chills.  He denies any diaphoresis or cold sweats.  He was referred by his cardiologist Dr. Wood.  Patient works as a .    CCTA 10/9/23 Possible obstructive disease     C 10/24/23  Post OP Findings:  Left Main: large caliber vessel with minimal luminal irregularities  LAD: proximal .  The vessel is medium to large in caliber and traverses to the apex.  There is a medium caliber D1.  The vessel fills retrograde from collaterals from the right and pda.  LCx: medium caliber non dominant vessel with medium caliber om1, the vessel has diffuse nonobstructive disease and feeds colleraterals to a medium to large caliber ramus intermedius  Ramus Intermedius:  Medium  to large caliber vessel with ostial , fed distally from left to left collaterals  RCA: large caliber dominant vessel with nonobstructive plaque in mid portion, feeds brisk collaterals to LAD and diagonal distally    Plan: Return patient to nursing unit and monitor for access site complications.  Aggressive medical management.  Start high dose statin therapy, antiplatelet therapy.  Consult surgery for consideration for surgical revascularization.    ECG 10/24/23 SB rate 57     CUS IMPRESSION:10/24/23  1. 20% or less narrowing on the right.  2. 20% or less narrowing on the  left.    SAPHENOUS VEIN MAPPING: 10/24/23    Veins in both lower extremities were observed and measurements obtained. Values as follows:                                    Right            left  Posterior thigh       0.94 cm       0.79 cm    Mid thigh                 0.54 cm      0.70 cm    Distal thigh             0.57 cm       0.55 cm    At knee                   0.49 cm        0.55 cm    Proximal calf.         0.37 cm        0.48 cm    Mid calf                   0.37 cm        0.41 cm    Distal calf               0.43 cm         0.35 cm    PFT 10/24/2023 FEV 1 3.42 or 79% predicted FVC 4.14 76% predicted-mild restrictive disease.    TTE 10/02/2023 ejection fraction 61% no AI or AS, trace mitral regurgitation, aortic root is dilated measuring 3.9 cm.    Clinic 11/08/2023-status post off pump CAB x2 on 10/30/2023 presents for one-week post discharge follow up.  Patient reports to be doing well denies any signs symptoms of infection surgical site.  Denies any chest pain or shortness of breath.  He was moving well except for his hip pain.  Requesting get back on indomethacin.  Reviewed lab work.    Clinic 11/20/2023: Feels good at home. No SOB, c/p, legs edema. Has been cutting back salt and processed foods intake. No incisional pain. Home Health has been seeing patient w/o issues. No chills/fever or swelling at CT site. NO drainage there.     Review of Systems   Constitutional: Negative for chills, diaphoresis, fever and malaise/fatigue.   HENT: Negative.  Negative for congestion and ear discharge.    Eyes:  Negative for pain, photophobia and redness.   Cardiovascular:  Negative for chest pain, claudication and dyspnea on exertion.   Respiratory:  Positive for snoring. Negative for cough and hemoptysis.    Skin:  Negative for dry skin and flushing.   Musculoskeletal:  Positive for joint pain.   Gastrointestinal:  Negative for anorexia.   Genitourinary:  Negative for dysuria and flank pain.   Neurological:  Negative  "for aphonia, brief paralysis, focal weakness and weakness.      Past Medical History:   Diagnosis Date    CAD (coronary artery disease)     HLD (hyperlipidemia)     HTN (hypertension)       Past Surgical History:   Procedure Laterality Date    CARDIAC CATHETERIZATION Left 10/24/2023    CORONARY ARTERY BYPASS GRAFT  10/30/2023    x2 vessels      Family History   Adopted: Yes      Social History     Socioeconomic History    Marital status:    Tobacco Use    Smoking status: Every Day     Types: Vaping w/o nicotine    Smokeless tobacco: Never   Substance and Sexual Activity    Alcohol use: Yes     Comment: rare        Medication List with Changes/Refills   Current Medications    AMIODARONE (PACERONE) 200 MG TAB    TAKE 2 TABLETS BY MOUTH TWICE A DAY FOR 30 DAYS    ASPIRIN (ECOTRIN) 81 MG EC TABLET    Take 81 mg by mouth once daily.    ATORVASTATIN (LIPITOR) 80 MG TABLET    Take 80 mg by mouth every evening.    FUROSEMIDE (LASIX) 40 MG TABLET    Take 40 mg by mouth.    HYDROCODONE-ACETAMINOPHEN (NORCO) 5-325 MG PER TABLET    TAKE 1 TABLET BY MOUTH EVERY 8 TO 12 HOURS AS NEEDED FOR PAIN    INDOMETHACIN (INDOCIN SR) 75 MG CPSR CR CAPSULE    Take 75 mg by mouth 2 (two) times daily.    ISOSORBIDE MONONITRATE (IMDUR) 30 MG 24 HR TABLET    Take 30 mg by mouth once daily.    LISINOPRIL 10 MG TABLET    Take 10 mg by mouth once daily.    METOPROLOL TARTRATE (LOPRESSOR) 25 MG TABLET    Take 25 mg by mouth 2 (two) times daily.    PANTOPRAZOLE (PROTONIX) 40 MG TABLET    Take 40 mg by mouth.    POTASSIUM CHLORIDE (MICRO-K) 10 MEQ CPSR    Take 10 mEq by mouth.        Objective:     /78   Pulse (!) 55   Resp 20   Ht 5' 11" (1.803 m)   Wt (!) 164.9 kg (363 lb 9.6 oz)   SpO2 97%   BMI 50.71 kg/m²     Physical Exam  Constitutional:       Appearance: Normal appearance.   HENT:      Head: Normocephalic.      Nose: Nose normal.   Eyes:      Pupils: Pupils are equal, round, and reactive to light.   Cardiovascular:      Rate " and Rhythm: Normal rate and regular rhythm.   Pulmonary:      Effort: Pulmonary effort is normal.      Breath sounds: Normal breath sounds.   Abdominal:      General: Abdomen is flat.      Palpations: Abdomen is soft.   Musculoskeletal:         General: Normal range of motion.   Skin:     General: Skin is warm and dry.      Capillary Refill: Capillary refill takes less than 2 seconds.      Comments: The sternum well-approximated with no erythema or drainage.  Left leg saphenous harvest site no erythema or drainage.  Chronic venous insufficiency bilateral lower extremities   Neurological:      General: No focal deficit present.      Mental Status: He is alert and oriented to person, place, and time.          Labs:  Sodium   Date Value Ref Range Status   10/27/2023 135 131 - 143 mmol/L Final     Potassium   Date Value Ref Range Status   10/27/2023 4.8 3.5 - 5.1 mmol/L Final     Chloride   Date Value Ref Range Status   10/27/2023 102 98 - 107 mmol/L Final     CO2   Date Value Ref Range Status   10/27/2023 29 21 - 32 mmol/L Final     Glucose   Date Value Ref Range Status   10/27/2023 100 74 - 106 mg/dL Final     BUN   Date Value Ref Range Status   10/27/2023 15.0 7.0 - 18.0 mg/dL Final     Creatinine   Date Value Ref Range Status   10/27/2023 0.93 0.70 - 1.30 mg/dL Final     Calcium   Date Value Ref Range Status   10/27/2023 9.2 8.5 - 10.1 mg/dL Final     Total Protein   Date Value Ref Range Status   10/27/2023 7.0 6.4 - 8.2 g/dL Final     Albumin   Date Value Ref Range Status   10/27/2023 3.7 3.4 - 5.0 g/dL Final     Total Bilirubin   Date Value Ref Range Status   10/27/2023 0.8 0.2 - 1.0 mg/dL Final     Alkaline Phosphatase   Date Value Ref Range Status   10/27/2023 66 45 - 117 U/L Final     AST   Date Value Ref Range Status   10/27/2023 18 15 - 37 U/L Final     ALT   Date Value Ref Range Status   10/27/2023 31 16 - 61 U/L Final     Anion Gap   Date Value Ref Range Status   10/27/2023 4.0 3.0 - 11.0 mmol/L Final     "  Lab Results   Component Value Date    ALT 31 10/27/2023    AST 18 10/27/2023    ALKPHOS 66 10/27/2023    BILITOT 0.8 10/27/2023      No results found for: "PT"  Lab Results   Component Value Date    WBC 6.2 10/27/2023    HGB 16.4 10/27/2023    HCT 49.4 10/27/2023    MCV 84.7 10/27/2023    LABPLAT 232 10/27/2023       No results found for: "BLOODTYPE"      X-Ray Chest PA And Lateral  Narrative: EXAMINATION:  XR CHEST PA AND LATERAL    CLINICAL HISTORY:  Presence of aortocoronary bypass graft    TECHNIQUE:  PA and lateral views of the chest were performed.    COMPARISON:  None    FINDINGS:  Cardiac silhouette is enlarged.  Prior sternotomy noted.  There is posterior costophrenic angle blunting, likely on the left which is concerning for a small pleural effusion.  There is some ill-defined somewhat linear parenchymal opacities in the left lung base which likely represent subsegmental atelectasis.  Infectious/inflammatory infiltrate not entirely excluded, correlate clinically.  No acute osseous findings demonstrated.  Impression: As above    Electronically signed by: Cricket Davenport MD  Date:    11/08/2023  Time:    13:27     No image results found.           Assessment & Plan:     Coronary artery disease status post CAB x2  Hypertension   Hyperlipidemia   Obesity  Osteoarthritis yet   Probable sleep apnea   Tobacco use vapes     10/27/2023 discussed coronary artery bypass grafting times 2-3 vessels.  STs risk 0.8% discussed with patient patient is agreeable to proceed.  Hold Ace and NSAIDs prior to surgery.  Continue baby aspirin.  Scheduled for Monday.    11/08/2023 doing well!  Patient continue progressive mobility and IS.  Continue sternal precautions..  Stop Ten mEq potassium daily.  Recheck BMP in one-week.  Requesting to restart indomethacin we will discuss with Dr. Briceno get back with the patient.  Return to clinic 3 weeks and p.r.n.    11/20/2023: No new issues since last visit on 11/8/2023. CT site had " scalp that fell off. Drainage is lessened. No redness at site. Site is steri-stripped. BMP checked. Stable renal function, K borderline high at 5.1. Advised low K diet- list of low K foods given to pt/wife.  Repeat labs in 2 weeks prior to next office visit. If K continues to be elevated, may need to cut down or d/c Lisinopril. BP is stable in the 120's at home. Sternum is stable on exam but still too early to drive in highway. Pt is inquiring about hip surgery. Plan to revisit at 30 days office visit.     12/01/2023 stop amiodarone, recommend cardiac rehab, check BMP today,  stopping Lasix (monitor for 2 pound weight gain over 2 days), re-evaluate potassium (stable, consider stopping lisinopril. F/U Cardiology. RTC PRN    Clarence Murray FNP-C  Cardiothoracic Vascular Surgery

## 2023-12-05 DIAGNOSIS — Z95.1 HX OF CABG: Primary | ICD-10-CM

## 2024-01-19 ENCOUNTER — TELEPHONE (OUTPATIENT)
Dept: CARDIOTHORACIC SURGERY | Facility: CLINIC | Age: 44
End: 2024-01-19
Payer: COMMERCIAL

## 2024-01-19 NOTE — TELEPHONE ENCOUNTER
01-19-24 @ 14:32    FAXED (with conf)(x2) last 2 most recent CXR reports and last office visit notes to [facility] / Dr. Will Wood.    [13 pgs]        F: [phone]  F: [phone]

## 2024-01-19 NOTE — TELEPHONE ENCOUNTER
----- Message from Mary Steel sent at 1/19/2024 12:47 PM CST -----  Contact: [phone]  Patient is requesting a call back regarding records of chest x-rays. Please patient give a call back at [phone]